# Patient Record
Sex: FEMALE | Race: WHITE | NOT HISPANIC OR LATINO | URBAN - METROPOLITAN AREA
[De-identification: names, ages, dates, MRNs, and addresses within clinical notes are randomized per-mention and may not be internally consistent; named-entity substitution may affect disease eponyms.]

---

## 2017-10-27 NOTE — PATIENT DISCUSSION
NO SIGN OF FLUID OR CNV ON FA OR OCT - UNCLEAR WHEN LAST TX BASED ON NOTE WHICH INDICATES LAST TX 6 12 17 BUT PT INDICATES SHE WAS TX SEPT 18 17 - NO PROCEDURE NOTE TO INDICATE TX - NEVER THE LESS AS IT LOOKS DRY AND GOOD TODAY AND PT HAS DONE WELL WITH LONGER F/U TO FOLLOW.

## 2018-01-26 NOTE — PROCEDURE NOTE: CLINICAL
PROCEDURE NOTE: Avastin 1.25mg OD. Diagnosis: Neovascular AMD with Active CNV. Anesthesia: Topical. Prep: Betadine Drops. Prior to injection, risks/benefits/alternatives discussed including infection, loss of vision, hemorrhage, cataract, glaucoma, retinal tears or detachment. The off-label status of Intravitreal Avastin also was reviewed. The patient wished to proceed with treatment. Topical anesthesia was induced with Alcaine. Additional anesthesia was achieved using drop(s) or injection checked above. A drop of Povidone-iodine 5% ophthalmic solution was instilled over the injection site and in the inferior fornix. Betadine prep was performed. Using the syringe provided, Avastin 1.25 mg in 0.05 cc was injected into the vitreous cavity. The needle was passed 3.0 mm posterior to the limbus in pseudophakic patients, and 3.5 mm posterior to the limbus in phakic patients. Patient tolerated procedure well. There were no complications. Injection time: *. Lot #: [email or web address]. Expiration Date: . EXP. Inventory Id: null. Post-op instructions given. The patient was instructed to return for re-evaluation in approximately 4-12 weeks depending on his/her condition and was told to call immediately if vision decreases and/or if his/her eye becomes red, painful, and/or light sensitive. The patient was instructed to go to the emergency room or call 911 if unable to reach the doctor within an hour or two of trying or calling. The patient was instructed to use Artificial Tears q.i.d. p.r.n for comfort. Terrance Felder

## 2018-02-02 NOTE — PROCEDURE NOTE: CLINICAL
PROCEDURE NOTE: Avastin 1.25mg OS. Diagnosis: Neovascular AMD with Active CNV. Anesthesia: Topical. Prep: Betadine Drops. Prior to injection, risks/benefits/alternatives discussed including infection, loss of vision, hemorrhage, cataract, glaucoma, retinal tears or detachment. The off-label status of Intravitreal Avastin also was reviewed. The patient wished to proceed with treatment. Topical anesthesia was induced with Alcaine. Additional anesthesia was achieved using drop(s) or injection checked above. A drop of Povidone-iodine 5% ophthalmic solution was instilled over the injection site and in the inferior fornix. Betadine prep was performed. Using the syringe provided, Avastin 1.25 mg in 0.05 cc was injected into the vitreous cavity. The needle was passed 3.0 mm posterior to the limbus in pseudophakic patients, and 3.5 mm posterior to the limbus in phakic patients. Patient tolerated procedure well. There were no complications. Injection time: *. Lot #: [email or web address]. Expiration Date: . EXP. Inventory Id: null. Post-op instructions given. The patient was instructed to return for re-evaluation in approximately 4-12 weeks depending on his/her condition and was told to call immediately if vision decreases and/or if his/her eye becomes red, painful, and/or light sensitive. The patient was instructed to go to the emergency room or call 911 if unable to reach the doctor within an hour or two of trying or calling. The patient was instructed to use Artificial Tears q.i.d. p.r.n for comfort. Adam Juan

## 2018-03-16 NOTE — PROCEDURE NOTE: CLINICAL
PROCEDURE NOTE: Avastin 1.25mg OS. Diagnosis: Neovascular AMD with Active CNV. Anesthesia: Topical. Prep: Betadine Drops. Prior to injection, risks/benefits/alternatives discussed including infection, loss of vision, hemorrhage, cataract, glaucoma, retinal tears or detachment. The off-label status of Intravitreal Avastin also was reviewed. The patient wished to proceed with treatment. Topical anesthesia was induced with Alcaine. Additional anesthesia was achieved using drop(s) or injection checked above. A drop of Povidone-iodine 5% ophthalmic solution was instilled over the injection site and in the inferior fornix. Betadine prep was performed. Using the syringe provided, Avastin 1.25 mg in 0.05 cc was injected into the vitreous cavity. The needle was passed 3.0 mm posterior to the limbus in pseudophakic patients, and 3.5 mm posterior to the limbus in phakic patients. Patient tolerated procedure well. There were no complications. Injection time: *. Lot #: [email or web address]. Expiration Date: . EXP. Inventory Id: null. Post-op instructions given. The patient was instructed to return for re-evaluation in approximately 4-12 weeks depending on his/her condition and was told to call immediately if vision decreases and/or if his/her eye becomes red, painful, and/or light sensitive. The patient was instructed to go to the emergency room or call 911 if unable to reach the doctor within an hour or two of trying or calling. The patient was instructed to use Artificial Tears q.i.d. p.r.n for comfort. Kareem Wilson

## 2018-05-31 ENCOUNTER — IMPORTED ENCOUNTER (OUTPATIENT)
Dept: URBAN - METROPOLITAN AREA CLINIC 50 | Facility: CLINIC | Age: 63
End: 2018-05-31

## 2018-06-22 ENCOUNTER — IMPORTED ENCOUNTER (OUTPATIENT)
Dept: URBAN - METROPOLITAN AREA CLINIC 50 | Facility: CLINIC | Age: 63
End: 2018-06-22

## 2018-09-28 ENCOUNTER — IMPORTED ENCOUNTER (OUTPATIENT)
Dept: URBAN - METROPOLITAN AREA CLINIC 50 | Facility: CLINIC | Age: 63
End: 2018-09-28

## 2019-01-18 ENCOUNTER — IMPORTED ENCOUNTER (OUTPATIENT)
Dept: URBAN - METROPOLITAN AREA CLINIC 50 | Facility: CLINIC | Age: 64
End: 2019-01-18

## 2019-01-18 NOTE — PATIENT DISCUSSION
"""no signs of rd and tear. signs and symptoms of rd and tear discussed with patient .  discussed ""

## 2019-05-17 ENCOUNTER — IMPORTED ENCOUNTER (OUTPATIENT)
Dept: URBAN - METROPOLITAN AREA CLINIC 50 | Facility: CLINIC | Age: 64
End: 2019-05-17

## 2019-09-20 ENCOUNTER — IMPORTED ENCOUNTER (OUTPATIENT)
Dept: URBAN - METROPOLITAN AREA CLINIC 50 | Facility: CLINIC | Age: 64
End: 2019-09-20

## 2020-01-07 ENCOUNTER — IMPORTED ENCOUNTER (OUTPATIENT)
Dept: URBAN - METROPOLITAN AREA CLINIC 50 | Facility: CLINIC | Age: 65
End: 2020-01-07

## 2020-01-31 ENCOUNTER — IMPORTED ENCOUNTER (OUTPATIENT)
Dept: URBAN - METROPOLITAN AREA CLINIC 50 | Facility: CLINIC | Age: 65
End: 2020-01-31

## 2020-02-04 ENCOUNTER — IMPORTED ENCOUNTER (OUTPATIENT)
Dept: URBAN - METROPOLITAN AREA CLINIC 50 | Facility: CLINIC | Age: 65
End: 2020-02-04

## 2020-08-19 ENCOUNTER — IMPORTED ENCOUNTER (OUTPATIENT)
Dept: URBAN - METROPOLITAN AREA CLINIC 50 | Facility: CLINIC | Age: 65
End: 2020-08-19

## 2020-08-19 NOTE — PATIENT DISCUSSION
"""IOP's stable and within target range OU with current drop therapy.  Continue to monitor."" ""Continue Latanoprost both eyes at bedtime ."""

## 2020-08-20 ENCOUNTER — IMPORTED ENCOUNTER (OUTPATIENT)
Dept: URBAN - METROPOLITAN AREA CLINIC 50 | Facility: CLINIC | Age: 65
End: 2020-08-20

## 2020-09-02 ENCOUNTER — IMPORTED ENCOUNTER (OUTPATIENT)
Dept: URBAN - METROPOLITAN AREA CLINIC 50 | Facility: CLINIC | Age: 65
End: 2020-09-02

## 2020-09-16 ENCOUNTER — IMPORTED ENCOUNTER (OUTPATIENT)
Dept: URBAN - METROPOLITAN AREA CLINIC 50 | Facility: CLINIC | Age: 65
End: 2020-09-16

## 2020-09-24 ENCOUNTER — IMPORTED ENCOUNTER (OUTPATIENT)
Dept: URBAN - METROPOLITAN AREA CLINIC 50 | Facility: CLINIC | Age: 65
End: 2020-09-24

## 2020-09-24 NOTE — PATIENT DISCUSSION
"""S/P IOL OS: Tecnis [id number] 18.0 (ORA) @ 108Âº (Target: [place]) +ORA/Femto +Omidria. Continue post operative instructions and drops per schedule.  """

## 2020-09-28 ENCOUNTER — IMPORTED ENCOUNTER (OUTPATIENT)
Dept: URBAN - METROPOLITAN AREA CLINIC 50 | Facility: CLINIC | Age: 65
End: 2020-09-28

## 2020-09-30 ENCOUNTER — IMPORTED ENCOUNTER (OUTPATIENT)
Dept: URBAN - METROPOLITAN AREA CLINIC 50 | Facility: CLINIC | Age: 65
End: 2020-09-30

## 2020-09-30 NOTE — PATIENT DISCUSSION
"""S/P IOL OS: Tecnis [id number] 18.0 (ORA) @ 108Âº (Target: [place]) +ORA/Femto +Omidria. Continue post operative instructions and drops per schedule.  """

## 2020-09-30 NOTE — PATIENT DISCUSSION
"""Patient states she's noticed pupils are different in size.  Explained that this is physiological ""

## 2020-10-08 ENCOUNTER — IMPORTED ENCOUNTER (OUTPATIENT)
Dept: URBAN - METROPOLITAN AREA CLINIC 50 | Facility: CLINIC | Age: 65
End: 2020-10-08

## 2020-10-08 NOTE — PATIENT DISCUSSION
"""S/P IOL OD: Tecnis [id number] 19.0 @ 85Âº (Target: [place]) +Femto +Omidria. Continue post operative instructions and drops per schedule.  """

## 2020-10-13 ENCOUNTER — IMPORTED ENCOUNTER (OUTPATIENT)
Dept: URBAN - METROPOLITAN AREA CLINIC 50 | Facility: CLINIC | Age: 65
End: 2020-10-13

## 2020-10-13 NOTE — PATIENT DISCUSSION
"""S/P IOL OD: Tecnis [id number] 19.0 @ 85Âº (Target: [place]) +Femto +Omidria. Continue post operative instructions and drops per schedule.  """

## 2020-11-03 ENCOUNTER — IMPORTED ENCOUNTER (OUTPATIENT)
Dept: URBAN - METROPOLITAN AREA CLINIC 50 | Facility: CLINIC | Age: 65
End: 2020-11-03

## 2020-11-03 NOTE — PATIENT DISCUSSION
"""S/P IOL OU: OD: Tecnis [id number] 19.0 @ 85Âº (Target: [place])FemtoOmidria. OS: Tecnis [id number] 18.0 (ORA) @ 108Âº (Target: [place]) +ORA. "

## 2020-11-03 NOTE — PATIENT DISCUSSION
"""Patient is bothered and feels that she is having to lift her eye lids with her forehead in order to ""

## 2021-02-19 ENCOUNTER — IMPORTED ENCOUNTER (OUTPATIENT)
Dept: URBAN - METROPOLITAN AREA CLINIC 50 | Facility: CLINIC | Age: 66
End: 2021-02-19

## 2021-03-31 NOTE — PATIENT DISCUSSION
GLAUCOMA:  I have talked with the patient about my impressions, explained our treatment plan, and have answered all questions and patient understands. Continue present eye drops.  Patient to follow up in 6-8 weeks/ VF OU then

## 2021-03-31 NOTE — PATIENT DISCUSSION
Stopped Today: Inveltys (loteprednol etabonate): drops,suspension: 1% 1 drop three times a day into left eye 03-

## 2021-03-31 NOTE — PATIENT DISCUSSION
New Prescription: Lotemax (loteprednol etabonate): drops,suspension: 0.5% 1 drop twice a day into right eye 03-

## 2021-04-17 ASSESSMENT — VISUAL ACUITY
OD_CC: J1+@ 13 IN
OD_BAT: 20/25
OD_BAT: 20/25
OS_CC: 20/25
OS_CC: 20/30-1
OS_CC: 20/30-2
OD_OTHER: 20/25. 20/60.
OD_CC: 20/20
OD_CC: 20/20
OD_CC: J1+
OD_CC: J1+@ 16 IN
OD_OTHER: 20/60.
OD_OTHER: 20/25. 20/60.
OS_SC: 20/20
OS_OTHER: 20/20.
OD_PH: 20/30
OD_BAT: 20/25
OS_CC: 20/20
OS_OTHER: 20/30. 20/30.
OD_SC: 20/50-1
OS_SC: 20/25-2
OS_CC: 20/25-3
OS_CC: 20/20-1
OS_CC: 20/60
OS_CC: 20/20-1
OD_CC: 20/20-1
OS_CC: J1+
OD_CC: J1+@ 16 IN
OD_OTHER: 20/25. 20/50.
OD_OTHER: 20/20.
OS_BAT: 20/30
OD_SC: 20/40
OS_CC: J1+
OS_CC: J1+@ 16 IN
OD_BAT: 20/20
OS_BAT: 20/20
OS_SC: 20/20-3
OS_OTHER: 20/30. 20/30.
OS_CC: 20/25
OS_CC: J1+@ 16 IN
OS_BAT: 20/30
OS_OTHER: 20/60.
OD_SC: 20/20-1
OD_CC: 20/20-1
OS_OTHER: 20/30. 20/40.
OS_OTHER: 20/20. 20/25.
OD_CC: J1+@ 16 IN
OS_CC: J1+@ 16 IN
OS_BAT: 20/30
OD_CC: 20/25
OD_CC: 20/25-1
OD_SC: 20/20
OS_SC: 20/20
OD_CC: J1+
OS_CC: J1+@ 13 IN
OD_CC: 20/20
OD_CC: 20/20-1
OD_OTHER: 20/20. 20/25.

## 2021-04-17 ASSESSMENT — PACHYMETRY
OD_CT_UM: 527
OS_CT_UM: 523
OD_CT_UM: 527
OD_CT_UM: 527
OS_CT_UM: 523
OS_CT_UM: 523
OD_CT_UM: 527
OS_CT_UM: 523
OD_CT_UM: 527
OS_CT_UM: 523
OD_CT_UM: 527
OS_CT_UM: 523
OD_CT_UM: 527
OD_CT_UM: 527
OS_CT_UM: 523
OD_CT_UM: 527
OS_CT_UM: 523
OS_CT_UM: 523
OD_CT_UM: 527
OS_CT_UM: 523

## 2021-04-17 ASSESSMENT — TONOMETRY
OS_IOP_MMHG: 13
OD_IOP_MMHG: 18
OS_IOP_MMHG: 15
OD_IOP_MMHG: 16
OS_IOP_MMHG: 24
OS_IOP_MMHG: 12
OD_IOP_MMHG: 13
OS_IOP_MMHG: 11
OS_IOP_MMHG: 16
OD_IOP_MMHG: 15
OD_IOP_MMHG: 15
OS_IOP_MMHG: 14
OD_IOP_MMHG: 14
OS_IOP_MMHG: 13
OD_IOP_MMHG: 13
OS_IOP_MMHG: 14
OD_IOP_MMHG: 14
OD_IOP_MMHG: 14
OS_IOP_MMHG: 16
OS_IOP_MMHG: 13
OS_IOP_MMHG: 15
OD_IOP_MMHG: 15
OD_IOP_MMHG: 15
OD_IOP_MMHG: 12
OS_IOP_MMHG: 14
OS_IOP_MMHG: 12
OS_IOP_MMHG: 14
OS_IOP_MMHG: 17
OD_IOP_MMHG: 18
OD_IOP_MMHG: 12
OS_IOP_MMHG: 15
OD_IOP_MMHG: 14
OS_IOP_MMHG: 13
OS_IOP_MMHG: 23
OS_IOP_MMHG: 13
OD_IOP_MMHG: 19
OS_IOP_MMHG: 16
OD_IOP_MMHG: 17
OD_IOP_MMHG: 16
OS_IOP_MMHG: 12
OD_IOP_MMHG: 13
OD_IOP_MMHG: 12
OD_IOP_MMHG: 13
OD_IOP_MMHG: 13
OS_IOP_MMHG: 12
OS_IOP_MMHG: 15

## 2021-06-08 ENCOUNTER — PREPPED CHART (OUTPATIENT)
Dept: URBAN - METROPOLITAN AREA CLINIC 52 | Facility: CLINIC | Age: 66
End: 2021-06-08

## 2021-06-11 ENCOUNTER — FOLLOW UP (OUTPATIENT)
Dept: URBAN - METROPOLITAN AREA CLINIC 52 | Facility: CLINIC | Age: 66
End: 2021-06-11

## 2021-06-11 DIAGNOSIS — H40.013: ICD-10-CM

## 2021-06-11 PROCEDURE — 92012 INTRM OPH EXAM EST PATIENT: CPT

## 2021-06-11 PROCEDURE — 92083 EXTENDED VISUAL FIELD XM: CPT

## 2021-06-11 PROCEDURE — 92133 CPTRZD OPH DX IMG PST SGM ON: CPT

## 2021-06-11 ASSESSMENT — TONOMETRY
OD_IOP_MMHG: 18
OS_IOP_MMHG: 18
OD_IOP_MMHG: 17
OS_IOP_MMHG: 17

## 2021-06-11 ASSESSMENT — VISUAL ACUITY
OD_SC: 20/25-2 SLOW
OS_PH: 20/25
OS_SC: 20/30-2

## 2021-06-11 NOTE — PATIENT DISCUSSION
The IOP is in the target range. Observation without drop therapy. Testing reviewed with patient today.

## 2021-12-10 ENCOUNTER — COMPREHENSIVE EXAM (OUTPATIENT)
Dept: URBAN - METROPOLITAN AREA CLINIC 52 | Facility: CLINIC | Age: 66
End: 2021-12-10

## 2021-12-10 DIAGNOSIS — H26.493: ICD-10-CM

## 2021-12-10 DIAGNOSIS — H52.4: ICD-10-CM

## 2021-12-10 DIAGNOSIS — H35.372: ICD-10-CM

## 2021-12-10 DIAGNOSIS — H40.013: ICD-10-CM

## 2021-12-10 PROCEDURE — 92014 COMPRE OPH EXAM EST PT 1/>: CPT

## 2021-12-10 PROCEDURE — 92134 CPTRZ OPH DX IMG PST SGM RTA: CPT

## 2021-12-10 ASSESSMENT — TONOMETRY
OS_IOP_MMHG: 15
OS_IOP_MMHG: 16
OD_IOP_MMHG: 15
OD_IOP_MMHG: 16

## 2021-12-10 ASSESSMENT — VISUAL ACUITY
OS_SC: 20/30-2
OU_CC: J1+
OD_SC: 20/25+2
OD_GLARE: 20/25
OS_GLARE: 20/25
OS_PH: 20/20

## 2022-03-23 NOTE — PROCEDURE NOTE: CLINICAL
PROCEDURE NOTE: Avastin 1.25mg OD. Diagnosis: Neovascular AMD with Active CNV. Prior to injection, risks/benefits/alternatives discussed including infection, loss of vision, hemorrhage, cataract, glaucoma, retinal tears or detachment. The off-label status of Intravitreal Avastin also was reviewed. The patient wished to proceed with treatment. Topical anesthesia was induced with Alcaine. Additional anesthesia was achieved using drop(s) or injection checked above. A drop of Povidone-iodine 5% ophthalmic solution was instilled over the injection site and in the inferior fornix. Betadine prep was performed. Using the syringe provided, Avastin 1.25 mg in 0.05 cc was injected into the vitreous cavity. The needle was passed 3.0 mm posterior to the limbus in pseudophakic patients, and 3.5 mm posterior to the limbus in phakic patients. Patient tolerated procedure well. There were no complications. Injection time: 2:00PM. Lot #: [email or web address]. Expiration Date: 6864-68-91B30:00:00. Inventory Id: null. Post-op instructions given. The patient was instructed to return for re-evaluation in approximately 4-12 weeks depending on his/her condition and was told to call immediately if vision decreases and/or if his/her eye becomes red, painful, and/or light sensitive. The patient was instructed to go to the emergency room or call 911 if unable to reach the doctor within an hour or two of trying or calling. The patient was instructed to use Artificial Tears q.i.d. p.r.n for comfort. Marina Dnog
11

## 2022-05-13 ENCOUNTER — FOLLOW UP (OUTPATIENT)
Dept: URBAN - METROPOLITAN AREA CLINIC 52 | Facility: CLINIC | Age: 67
End: 2022-05-13

## 2022-05-13 DIAGNOSIS — H40.013: ICD-10-CM

## 2022-05-13 PROCEDURE — 92133 CPTRZD OPH DX IMG PST SGM ON: CPT

## 2022-05-13 PROCEDURE — 92083 EXTENDED VISUAL FIELD XM: CPT

## 2022-05-13 PROCEDURE — 92012 INTRM OPH EXAM EST PATIENT: CPT

## 2022-05-13 ASSESSMENT — VISUAL ACUITY
OU_SC: 20/20-2
OS_SC: 20/25-1
OD_SC: 20/25-2

## 2022-05-13 ASSESSMENT — TONOMETRY
OS_IOP_MMHG: 15
OD_IOP_MMHG: 17
OS_IOP_MMHG: 17
OD_IOP_MMHG: 15

## 2022-05-20 ENCOUNTER — DIAGNOSTICS ONLY (OUTPATIENT)
Dept: URBAN - METROPOLITAN AREA CLINIC 52 | Facility: CLINIC | Age: 67
End: 2022-05-20

## 2022-05-20 DIAGNOSIS — H02.831: ICD-10-CM

## 2022-05-20 DIAGNOSIS — H02.834: ICD-10-CM

## 2022-05-20 PROCEDURE — 92285 EXTERNAL OCULAR PHOTOGRAPHY: CPT

## 2022-05-20 PROCEDURE — 92082 INTERMEDIATE VISUAL FIELD XM: CPT

## 2022-09-16 ENCOUNTER — FOLLOW UP (OUTPATIENT)
Dept: URBAN - METROPOLITAN AREA CLINIC 52 | Facility: CLINIC | Age: 67
End: 2022-09-16

## 2022-09-16 DIAGNOSIS — H40.013: ICD-10-CM

## 2022-09-16 DIAGNOSIS — H26.493: ICD-10-CM

## 2022-09-16 PROCEDURE — 92012 INTRM OPH EXAM EST PATIENT: CPT

## 2022-09-16 ASSESSMENT — TONOMETRY
OD_IOP_MMHG: 15
OS_IOP_MMHG: 13
OS_IOP_MMHG: 11
OD_IOP_MMHG: 13

## 2022-09-16 ASSESSMENT — VISUAL ACUITY
OD_GLARE: 20/25-1
OD_GLARE: 20/40
OS_GLARE: 20/25
OS_GLARE: 20/30
OS_SC: 20/25
OD_SC: 20/25

## 2022-09-16 NOTE — PATIENT DISCUSSION
IOP stable 15/13, last appointment 17/17. Continue Latanoprost 1 gtt OU QHS, unsure of she needs refills will call in if so. RTC in 4 month for IOP check.

## 2022-09-16 NOTE — PATIENT DISCUSSION
OD worse than OS. Patient bothered and would like to have Yag next appointment, will dilate patient at the 4 month IOP check to also do yag.

## 2023-07-21 ENCOUNTER — COMPREHENSIVE EXAM (OUTPATIENT)
Dept: URBAN - METROPOLITAN AREA CLINIC 52 | Facility: CLINIC | Age: 68
End: 2023-07-21

## 2023-07-21 DIAGNOSIS — H26.492: ICD-10-CM

## 2023-07-21 DIAGNOSIS — H04.123: ICD-10-CM

## 2023-07-21 DIAGNOSIS — H35.372: ICD-10-CM

## 2023-07-21 DIAGNOSIS — H40.013: ICD-10-CM

## 2023-07-21 DIAGNOSIS — H43.813: ICD-10-CM

## 2023-07-21 PROCEDURE — 92083 EXTENDED VISUAL FIELD XM: CPT

## 2023-07-21 PROCEDURE — 99214 OFFICE O/P EST MOD 30 MIN: CPT

## 2023-07-21 PROCEDURE — 92133 CPTRZD OPH DX IMG PST SGM ON: CPT

## 2023-07-21 ASSESSMENT — KERATOMETRY
OS_K1POWER_DIOPTERS: 43.00
OD_K2POWER_DIOPTERS: 44.75
OS_AXISANGLE_DEGREES: 18
OD_AXISANGLE2_DEGREES: 84
OS_K2POWER_DIOPTERS: 45.75
OS_AXISANGLE2_DEGREES: 108
OD_AXISANGLE_DEGREES: 174
OD_K1POWER_DIOPTERS: 42.75

## 2023-07-21 ASSESSMENT — VISUAL ACUITY
OS_PH: 20/20
OS_GLARE: 20/20
OS_GLARE: 20/20
OU_CC: J1+@16
OD_SC: 20/20
OS_SC: 20/40+2

## 2023-07-21 ASSESSMENT — TONOMETRY
OD_IOP_MMHG: 14
OS_IOP_MMHG: 14
OS_IOP_MMHG: 16
OD_IOP_MMHG: 16

## 2024-02-23 ENCOUNTER — FOLLOW UP (OUTPATIENT)
Dept: URBAN - METROPOLITAN AREA CLINIC 52 | Facility: CLINIC | Age: 69
End: 2024-02-23

## 2024-02-23 DIAGNOSIS — H35.372: ICD-10-CM

## 2024-02-23 DIAGNOSIS — H40.013: ICD-10-CM

## 2024-02-23 PROCEDURE — 99213 OFFICE O/P EST LOW 20 MIN: CPT

## 2024-02-23 PROCEDURE — 92134 CPTRZ OPH DX IMG PST SGM RTA: CPT

## 2024-02-23 ASSESSMENT — TONOMETRY
OD_IOP_MMHG: 16
OS_IOP_MMHG: 16
OD_IOP_MMHG: 14
OS_IOP_MMHG: 14

## 2024-02-23 ASSESSMENT — VISUAL ACUITY
OD_SC: 20/25
OS_PH: 20/20
OS_SC: 20/40

## 2024-06-12 ENCOUNTER — ESTABLISHED PATIENT (OUTPATIENT)
Dept: URBAN - METROPOLITAN AREA CLINIC 52 | Facility: CLINIC | Age: 69
End: 2024-06-12

## 2024-06-12 DIAGNOSIS — S05.02XA: ICD-10-CM

## 2024-06-12 PROCEDURE — 99213 OFFICE O/P EST LOW 20 MIN: CPT

## 2024-06-12 RX ORDER — TOBRAMYCIN AND DEXAMETHASONE 1; 3 MG/ML; MG/ML
1 SUSPENSION/ DROPS OPHTHALMIC EVERY 4 HOURS
Start: 2024-06-12

## 2024-06-12 ASSESSMENT — TONOMETRY
OD_IOP_MMHG: 16
OD_IOP_MMHG: 14
OS_IOP_MMHG: 16
OS_IOP_MMHG: 14

## 2024-06-12 ASSESSMENT — VISUAL ACUITY
OS_SC: 20/30
OU_SC: 20/25
OD_SC: 20/25
OS_PH: 20/20

## 2024-06-19 ENCOUNTER — FOLLOW UP (OUTPATIENT)
Dept: URBAN - METROPOLITAN AREA CLINIC 50 | Facility: LOCATION | Age: 69
End: 2024-06-19

## 2024-06-19 DIAGNOSIS — S05.02XA: ICD-10-CM

## 2024-06-19 PROCEDURE — 99213 OFFICE O/P EST LOW 20 MIN: CPT

## 2024-06-19 ASSESSMENT — TONOMETRY
OS_IOP_MMHG: 19
OD_IOP_MMHG: 17
OD_IOP_MMHG: 15
OS_IOP_MMHG: 17

## 2024-06-19 ASSESSMENT — VISUAL ACUITY
OS_CC: 20/20
OU_CC: 20/20
OD_CC: 20/20

## 2024-06-26 ENCOUNTER — FOLLOW UP (OUTPATIENT)
Dept: URBAN - METROPOLITAN AREA CLINIC 52 | Facility: CLINIC | Age: 69
End: 2024-06-26

## 2024-06-26 DIAGNOSIS — S05.02XA: ICD-10-CM

## 2024-06-26 PROCEDURE — 99213 OFFICE O/P EST LOW 20 MIN: CPT

## 2024-06-26 RX ORDER — CYCLOSPORINE 0.5 MG/ML
1 EMULSION OPHTHALMIC TWICE A DAY
Start: 2024-06-26

## 2024-06-26 RX ORDER — SODIUM CHLORIDE 50 MG/ML
1 SOLUTION OPHTHALMIC TWICE A DAY
Start: 2024-06-26

## 2024-06-26 ASSESSMENT — TONOMETRY
OS_IOP_MMHG: 17
OD_IOP_MMHG: 17
OS_IOP_MMHG: 15
OD_IOP_MMHG: 15

## 2024-06-26 ASSESSMENT — VISUAL ACUITY
OS_CC: 20/20
OD_CC: 20/20
OU_CC: 20/15-2

## 2024-06-28 ENCOUNTER — FOLLOW UP (OUTPATIENT)
Dept: URBAN - METROPOLITAN AREA CLINIC 53 | Facility: CLINIC | Age: 69
End: 2024-06-28

## 2024-06-28 DIAGNOSIS — S05.02XA: ICD-10-CM

## 2024-06-28 DIAGNOSIS — H18.832: ICD-10-CM

## 2024-06-28 DIAGNOSIS — H40.013: ICD-10-CM

## 2024-06-28 DIAGNOSIS — T15.02XA: ICD-10-CM

## 2024-06-28 PROCEDURE — 99213 OFFICE O/P EST LOW 20 MIN: CPT

## 2024-06-28 RX ORDER — MOXIFLOXACIN OPHTHALMIC 5 MG/ML
1 SOLUTION/ DROPS OPHTHALMIC TWICE A DAY
Start: 2024-06-28

## 2024-06-28 ASSESSMENT — VISUAL ACUITY
OU_CC: 20/20
OS_CC: 20/25
OD_CC: 20/25

## 2024-06-28 ASSESSMENT — TONOMETRY
OD_IOP_MMHG: 16
OD_IOP_MMHG: 18

## 2024-07-09 ENCOUNTER — FOLLOW UP (OUTPATIENT)
Dept: URBAN - METROPOLITAN AREA CLINIC 52 | Facility: CLINIC | Age: 69
End: 2024-07-09

## 2024-07-09 DIAGNOSIS — H18.832: ICD-10-CM

## 2024-07-09 PROCEDURE — 99212 OFFICE O/P EST SF 10 MIN: CPT

## 2024-07-09 ASSESSMENT — TONOMETRY
OS_IOP_MMHG: 17
OD_IOP_MMHG: 15
OS_IOP_MMHG: 19
OD_IOP_MMHG: 17

## 2024-07-09 ASSESSMENT — VISUAL ACUITY
OD_CC: 20/20
OS_CC: 20/25-2

## 2024-07-17 ENCOUNTER — FOLLOW UP (OUTPATIENT)
Dept: URBAN - METROPOLITAN AREA CLINIC 52 | Facility: CLINIC | Age: 69
End: 2024-07-17

## 2024-07-17 DIAGNOSIS — H18.832: ICD-10-CM

## 2024-07-17 PROCEDURE — 99213 OFFICE O/P EST LOW 20 MIN: CPT

## 2024-07-17 RX ORDER — SODIUM CHLORIDE 50 MG/G: 1 OINTMENT OPHTHALMIC EVERY EVENING

## 2024-07-17 ASSESSMENT — TONOMETRY
OS_IOP_MMHG: 17
OS_IOP_MMHG: 15
OD_IOP_MMHG: 16
OD_IOP_MMHG: 14

## 2024-07-17 ASSESSMENT — VISUAL ACUITY: OD_CC: 20/25

## 2024-07-31 ENCOUNTER — FOLLOW UP (OUTPATIENT)
Dept: URBAN - METROPOLITAN AREA CLINIC 52 | Facility: CLINIC | Age: 69
End: 2024-07-31

## 2024-07-31 DIAGNOSIS — H18.832: ICD-10-CM

## 2024-07-31 PROCEDURE — 99213 OFFICE O/P EST LOW 20 MIN: CPT

## 2024-07-31 RX ORDER — TAFLUPROST 0 MG/.3ML: 1 SOLUTION/ DROPS OPHTHALMIC EVERY EVENING

## 2024-07-31 ASSESSMENT — TONOMETRY
OS_IOP_MMHG: 14
OD_IOP_MMHG: 14

## 2024-07-31 ASSESSMENT — VISUAL ACUITY: OD_CC: 20/20-1

## 2024-08-23 ENCOUNTER — COMPREHENSIVE EXAM (OUTPATIENT)
Dept: URBAN - METROPOLITAN AREA CLINIC 52 | Facility: CLINIC | Age: 69
End: 2024-08-23

## 2024-08-23 DIAGNOSIS — H04.123: ICD-10-CM

## 2024-08-23 DIAGNOSIS — H18.832: ICD-10-CM

## 2024-08-23 DIAGNOSIS — H52.4: ICD-10-CM

## 2024-08-23 DIAGNOSIS — H35.372: ICD-10-CM

## 2024-08-23 DIAGNOSIS — H02.831: ICD-10-CM

## 2024-08-23 DIAGNOSIS — H26.492: ICD-10-CM

## 2024-08-23 DIAGNOSIS — H43.813: ICD-10-CM

## 2024-08-23 DIAGNOSIS — H02.834: ICD-10-CM

## 2024-08-23 DIAGNOSIS — H40.013: ICD-10-CM

## 2024-08-23 PROCEDURE — 92083 EXTENDED VISUAL FIELD XM: CPT

## 2024-08-23 PROCEDURE — 99214 OFFICE O/P EST MOD 30 MIN: CPT

## 2024-08-23 PROCEDURE — 92133 CPTRZD OPH DX IMG PST SGM ON: CPT

## 2024-08-23 ASSESSMENT — TONOMETRY
OD_IOP_MMHG: 14
OS_IOP_MMHG: 12
OD_IOP_MMHG: 16
OS_IOP_MMHG: 14

## 2024-08-23 ASSESSMENT — VISUAL ACUITY
OU_CC: J1+
OD_CC: 20/20
OU_CC: 20/20
OS_CC: 20/25+1

## 2024-12-02 ENCOUNTER — DIAGNOSTICS ONLY (OUTPATIENT)
Age: 69
End: 2024-12-02

## 2024-12-02 DIAGNOSIS — H40.013: ICD-10-CM

## 2024-12-02 PROCEDURE — 92133 CPTRZD OPH DX IMG PST SGM ON: CPT

## 2024-12-02 PROCEDURE — 92083 EXTENDED VISUAL FIELD XM: CPT

## 2024-12-05 ENCOUNTER — FOLLOW UP (OUTPATIENT)
Age: 69
End: 2024-12-05

## 2024-12-05 DIAGNOSIS — H40.013: ICD-10-CM

## 2024-12-05 PROCEDURE — 92012 INTRM OPH EXAM EST PATIENT: CPT

## 2025-05-07 ENCOUNTER — COMPREHENSIVE EXAM (OUTPATIENT)
Age: 70
End: 2025-05-07

## 2025-05-07 DIAGNOSIS — H02.831: ICD-10-CM

## 2025-05-07 DIAGNOSIS — H04.123: ICD-10-CM

## 2025-05-07 DIAGNOSIS — H52.4: ICD-10-CM

## 2025-05-07 DIAGNOSIS — H35.372: ICD-10-CM

## 2025-05-07 DIAGNOSIS — H02.834: ICD-10-CM

## 2025-05-07 DIAGNOSIS — H43.813: ICD-10-CM

## 2025-05-07 DIAGNOSIS — H26.492: ICD-10-CM

## 2025-05-07 DIAGNOSIS — H40.013: ICD-10-CM

## 2025-05-07 PROCEDURE — 92015 DETERMINE REFRACTIVE STATE: CPT

## 2025-05-07 PROCEDURE — 99214 OFFICE O/P EST MOD 30 MIN: CPT

## 2025-05-07 PROCEDURE — 92134 CPTRZ OPH DX IMG PST SGM RTA: CPT

## 2025-08-08 ENCOUNTER — FOLLOW UP (OUTPATIENT)
Age: 70
End: 2025-08-08

## 2025-08-08 DIAGNOSIS — H40.013: ICD-10-CM

## 2025-08-08 DIAGNOSIS — H53.8: ICD-10-CM

## 2025-08-08 PROCEDURE — 92083 EXTENDED VISUAL FIELD XM: CPT

## 2025-08-08 PROCEDURE — 92133 CPTRZD OPH DX IMG PST SGM ON: CPT

## 2025-08-08 PROCEDURE — 99213 OFFICE O/P EST LOW 20 MIN: CPT
